# Patient Record
Sex: MALE | Race: WHITE | NOT HISPANIC OR LATINO | Employment: OTHER | URBAN - METROPOLITAN AREA
[De-identification: names, ages, dates, MRNs, and addresses within clinical notes are randomized per-mention and may not be internally consistent; named-entity substitution may affect disease eponyms.]

---

## 2019-01-15 ENCOUNTER — OFFICE VISIT (OUTPATIENT)
Dept: NEUROSURGERY | Facility: CLINIC | Age: 59
End: 2019-01-15

## 2019-01-15 VITALS
DIASTOLIC BLOOD PRESSURE: 75 MMHG | SYSTOLIC BLOOD PRESSURE: 115 MMHG | WEIGHT: 190 LBS | BODY MASS INDEX: 27.2 KG/M2 | HEIGHT: 70 IN | TEMPERATURE: 98.3 F

## 2019-01-15 DIAGNOSIS — M48.061 SPINAL STENOSIS, LUMBAR REGION, WITHOUT NEUROGENIC CLAUDICATION: ICD-10-CM

## 2019-01-15 DIAGNOSIS — M54.16 LUMBAR RADICULOPATHY: ICD-10-CM

## 2019-01-15 DIAGNOSIS — M47.816 FACET HYPERTROPHY OF LUMBAR REGION: Primary | ICD-10-CM

## 2019-01-15 DIAGNOSIS — M51.36 DEGENERATIVE DISC DISEASE, LUMBAR: ICD-10-CM

## 2019-01-15 PROCEDURE — 99203 OFFICE O/P NEW LOW 30 MIN: CPT | Performed by: PHYSICIAN ASSISTANT

## 2019-01-15 RX ORDER — METHYLPREDNISOLONE 4 MG/1
TABLET ORAL
Refills: 0 | COMMUNITY
Start: 2018-11-13

## 2019-01-15 RX ORDER — IBUPROFEN 200 MG
600 TABLET ORAL EVERY 6 HOURS PRN
COMMUNITY

## 2019-01-15 NOTE — PROGRESS NOTES
"Patient: Chris Wise  : 1960  GENDER: male    Primary Care Provider: Autumn Reis DO    Requesting Provider: As above      History    Chief Complaint: Back and bilateral leg pain with walking and standing intolerances    History of Present Illness: Mr. 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  He presents today with a 10+ year history of low back pain, that have progressed to include standing/walking intolerances over the past 6 months.  Symptoms begin in his low back and extend laterally into his hips (L>R), wrapping anteriorly around his thigh, extending across the knee down to his anterior shins.  Patient denies feet involvement.  Symptoms improve with rest, the use of pain medicine and heat.  Symptoms are aggravated by bending/lifting/twisting activities, standing for prolonged periods of time, and walking greater than 0.5 miles.  Patient has attended 10-12 sessions of physical therapy, undergone multiple chiropractic adjustments, and he additionally has lost 15 pounds after hearing that weight loss could help improve his back symptoms - all with short-term pain relief.  Patient denies bowel or bladder dysfunction, or true leg weakness.  Patient has been taking Norco 5's prescribed by his PCP.  He has no other complaints at this time.      Review of Systems   Musculoskeletal: Positive for arthralgias, back pain, neck pain and neck stiffness.   Neurological: Positive for weakness and numbness.   Psychiatric/Behavioral: Positive for sleep disturbance.   All other systems reviewed and are negative.      The patient's past medical history, past surgical history, family history, and social history have been reviewed at length in the electronic medical record.    Physical Exam:   /75   Temp 98.3 °F (36.8 °C)   Ht 177.8 cm (70\")   Wt 86.2 kg (190 lb)   BMI 27.26 kg/m²   CONSTITUTIONAL:   - Patient is well-nourished  - Pleasant and " appears stated age.  CV:   - Regular rate and rhythm on palpable radial pulse   - No murmur appreciated   PULMONARY:   - Speaking in full sentences  - No use of accessory muscles   - Breathing is non-labored   - No wheezing   MUSCULOSKELETAL:  - Back tenderness to palpation is not observed.   - ROM in back is normal.  - Straight leg raise is negative bilaterally   - Sharath's sign is negative bilaterally   NEUROLOGICAL:  - A&Ox3  - Attention span, language function, and cognition are intact.  - Strength is intact in the upper and lower extremities to direct testing.  - Muscle tone is normal throughout.  - Station and gait are normal.  - Sensation is intact to light touch testing throughout.  - Deep tendon reflexes are 1+ and symmetrical.    - Leigha's Sign is negative bilaterally.  - No clonus is elicited.  - Coordination is intact.    Medical Decision Making    Data Review:   1. MRI Lumbar Spine (11/16/18): Diffuse degenerative changes throughout the lumbar spine with mild canal stenosis at L2-3, moderate stenosis at L3-4, and more generous stenosis at L4-5.  There is severe right and moderate left neural foraminal stenosis at L4-5.    Diagnosis/Treatment Options:  1. Facet hypertrophy of lumbar region  2. Spinal stenosis, lumbar region, without neurogenic claudication  3. Lumbar radiculopathy  4. Degenerative disc disease, lumbar    - Ambulatory Referral to Pain Management       Follow up:    Mr. Wise is seen today in the office with a 9+ year history of low back difficulties that have progressively gotten worse over the past 6 months to include walking and standing intolerances.  After reviewing his most recent MRI with Dr. Eddy, we have offered Mr. Wise 2 treatment options: 1 to try 1-2 epidural based injections with Dr. Tovar, or proceed directly to a CT myelogram of the lumbar spine to further help us delineate at what levels he is the most stenotic.  Patient would like to discuss our encounter with his  wife prior to making that decision.  I have made the referral to Dr. Tovar.  Patient will follow-up in the office after his first injection to reevaluate with Dr. Eddy.    Karen Celestin PA-C   1/15/2019   5:32 PM

## 2024-03-13 ENCOUNTER — OFFICE VISIT (OUTPATIENT)
Dept: NEUROSURGERY | Facility: CLINIC | Age: 64
End: 2024-03-13
Payer: COMMERCIAL

## 2024-03-13 VITALS — BODY MASS INDEX: 28.25 KG/M2 | HEIGHT: 67 IN | TEMPERATURE: 98 F | WEIGHT: 180 LBS

## 2024-03-13 DIAGNOSIS — M47.819 SPINAL ARTHRITIS: ICD-10-CM

## 2024-03-13 DIAGNOSIS — M51.36 DDD (DEGENERATIVE DISC DISEASE), LUMBAR: ICD-10-CM

## 2024-03-13 DIAGNOSIS — M48.061 SPINAL STENOSIS, LUMBAR REGION, WITHOUT NEUROGENIC CLAUDICATION: Primary | ICD-10-CM

## 2024-03-13 PROCEDURE — 99203 OFFICE O/P NEW LOW 30 MIN: CPT | Performed by: NEUROLOGICAL SURGERY

## 2024-03-13 RX ORDER — CARVEDILOL 6.25 MG/1
1 TABLET ORAL 2 TIMES DAILY
COMMUNITY
Start: 2024-01-04

## 2024-03-13 RX ORDER — LOSARTAN POTASSIUM 50 MG/1
TABLET ORAL
COMMUNITY

## 2024-03-13 RX ORDER — GABAPENTIN 600 MG/1
TABLET ORAL
COMMUNITY
Start: 2024-02-28

## 2024-03-13 RX ORDER — HYDROCODONE BITARTRATE AND ACETAMINOPHEN 10; 325 MG/1; MG/1
TABLET ORAL
COMMUNITY
Start: 2024-03-04

## 2024-03-13 RX ORDER — MORPHINE SULFATE 15 MG/1
TABLET, FILM COATED, EXTENDED RELEASE ORAL
COMMUNITY
Start: 2024-02-20

## 2024-03-13 RX ORDER — TIZANIDINE 4 MG/1
TABLET ORAL
COMMUNITY

## 2024-03-13 NOTE — PROGRESS NOTES
Patient: Chris Wise  : 1960    Primary Care Provider: Autumn Reis DO    Requesting Provider: As above        History    Chief Complaint: Low back and left leg pain.    History of Present Illness: Mr. 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  He has chronic back difficulties dating back some 20 years.  He was last seen in our clinic about 5 years ago.  In October/November of last year his symptoms flared up terribly.  He has had pain in his back extending into the left anterior thigh and then down into the shin and left lateral calf.  He saw Dr. Meyer who recommended L3-5 decompression and fusion.  He is here for another opinion.  He has done some therapy and overall feels substantially better.  He is hoping to avoid surgery.  His symptoms are worse with weightbearing he does have some trouble lying on his side at night.  Stretching is helpful.    Review of Systems   Constitutional:  Positive for activity change. Negative for appetite change, chills, diaphoresis, fatigue, fever and unexpected weight change.   HENT:  Negative for congestion, dental problem, drooling, ear discharge, ear pain, facial swelling, hearing loss, mouth sores, nosebleeds, postnasal drip, rhinorrhea, sinus pressure, sinus pain, sneezing, sore throat, tinnitus, trouble swallowing and voice change.    Eyes:  Negative for photophobia, pain, discharge, redness, itching and visual disturbance.   Respiratory:  Negative for apnea, cough, choking, chest tightness, shortness of breath, wheezing and stridor.    Cardiovascular:  Negative for chest pain, palpitations and leg swelling.   Gastrointestinal:  Negative for abdominal distention, abdominal pain, anal bleeding, blood in stool, constipation, diarrhea, nausea, rectal pain and vomiting.   Endocrine: Negative for cold intolerance, heat intolerance, polydipsia, polyphagia and polyuria.   Genitourinary:  Negative for decreased urine volume, difficulty  "urinating, dysuria, enuresis, flank pain, frequency, genital sores, hematuria, penile discharge, penile pain, penile swelling, scrotal swelling, testicular pain and urgency.   Musculoskeletal:  Positive for arthralgias, back pain and myalgias. Negative for gait problem, joint swelling, neck pain and neck stiffness.   Skin:  Negative for color change, pallor, rash and wound.   Allergic/Immunologic: Negative for environmental allergies, food allergies and immunocompromised state.   Neurological:  Positive for weakness and numbness. Negative for dizziness, tremors, seizures, syncope, facial asymmetry, speech difficulty, light-headedness and headaches.   Hematological:  Negative for adenopathy. Does not bruise/bleed easily.   Psychiatric/Behavioral:  Negative for agitation, behavioral problems, confusion, decreased concentration, dysphoric mood, hallucinations, self-injury, sleep disturbance and suicidal ideas. The patient is not nervous/anxious and is not hyperactive.      The patient's past medical history, past surgical history, family history, and social history have been reviewed at length in the electronic medical record.      Physical Exam:   Temp 98 °F (36.7 °C) (Infrared)   Ht 170.2 cm (67\")   Wt 81.6 kg (180 lb)   BMI 28.19 kg/m²   CONSTITUTIONAL: Patient is well-nourished, pleasant and appears stated age.  MUSCULOSKELETAL:  Straight leg raising is negative.  Sharath's Sign is negative.  ROM in the low back is normal.  Tenderness in the back to palpation is not observed.  NEUROLOGICAL:  Orientation, memory, attention span, language function, and cognition have been examined and are intact.  Strength is intact in the lower extremities to direct testing.  Muscle tone is normal throughout.  Station and gait are normal.  Sensation is intact to light touch testing throughout.  Deep tendon reflexes are 2+ and symmetrical except at the ankles where they are difficult to elicit.  Coordination is " intact.      Medical Decision Making    Data Review:   (All imaging studies were personally reviewed unless stated otherwise)  MRI of the lumbar spine dated 8/9/2023 is compared to the new study dated 1/9/2024.  There is generous stenosis at L3-4 and L4-5.  There is extensive degenerative disc and degenerative joint disease.  There may be a synovial cyst emanating from the L5-S1 level.    Diagnosis:   1.  Lumbar radiculopathy.  2.  Chronic mechanical low back pain.  3.  Lumbar stenosis with some degree of neurogenic claudication.    Treatment Options:   The patient is feeling much better and wishes to avoid surgery.  I do not think that is unreasonable.  If his symptoms progress then we certainly could revisit this.  Before considering surgery should it become necessary I would pursue a myelogram to determine whether he would require simple decompression or decompression and fusion.      Scribed for Jace Eddy MD by Phyllis Whittington MA on 3/13/2024 08:45 EDT      I, Dr. Eddy, personally performed the services described in the documentation, as scribed in my presence, and it is both accurate and complete.